# Patient Record
Sex: MALE | Race: WHITE | Employment: FULL TIME | URBAN - METROPOLITAN AREA
[De-identification: names, ages, dates, MRNs, and addresses within clinical notes are randomized per-mention and may not be internally consistent; named-entity substitution may affect disease eponyms.]

---

## 2018-01-08 PROBLEM — E66.01 OBESITY, MORBID (HCC): Status: ACTIVE | Noted: 2018-01-08

## 2019-01-21 ENCOUNTER — APPOINTMENT (OUTPATIENT)
Dept: GENERAL RADIOLOGY | Age: 59
End: 2019-01-21
Attending: EMERGENCY MEDICINE
Payer: COMMERCIAL

## 2019-01-21 ENCOUNTER — HOSPITAL ENCOUNTER (EMERGENCY)
Age: 59
Discharge: HOME | End: 2019-01-21
Attending: EMERGENCY MEDICINE
Payer: COMMERCIAL

## 2019-01-21 VITALS
RESPIRATION RATE: 18 BRPM | OXYGEN SATURATION: 95 % | DIASTOLIC BLOOD PRESSURE: 86 MMHG | SYSTOLIC BLOOD PRESSURE: 107 MMHG | TEMPERATURE: 98 F | HEART RATE: 99 BPM

## 2019-01-21 DIAGNOSIS — R06.02 SHORTNESS OF BREATH: Primary | ICD-10-CM

## 2019-01-21 LAB
ALBUMIN SERPL-MCNC: 3.6 G/DL (ref 3.2–4.6)
ALBUMIN/GLOB SERPL: 1.1 {RATIO} (ref 1.2–3.5)
ALP SERPL-CCNC: 82 U/L (ref 50–136)
ALT SERPL-CCNC: 47 U/L (ref 12–65)
ANION GAP SERPL CALC-SCNC: 6 MMOL/L (ref 7–16)
AST SERPL-CCNC: 28 U/L (ref 15–37)
ATRIAL RATE: 103 BPM
ATRIAL RATE: 96 BPM
BASOPHILS # BLD: 0 K/UL (ref 0–0.2)
BASOPHILS NFR BLD: 0 % (ref 0–2)
BILIRUB SERPL-MCNC: 0.9 MG/DL (ref 0.2–1.1)
BNP SERPL-MCNC: 585 PG/ML
BUN SERPL-MCNC: 21 MG/DL (ref 8–23)
CALCIUM SERPL-MCNC: 8.8 MG/DL (ref 8.3–10.4)
CALCULATED P AXIS, ECG09: 65 DEGREES
CALCULATED P AXIS, ECG09: 67 DEGREES
CALCULATED R AXIS, ECG10: 23 DEGREES
CALCULATED R AXIS, ECG10: 26 DEGREES
CALCULATED T AXIS, ECG11: 142 DEGREES
CALCULATED T AXIS, ECG11: 163 DEGREES
CHLORIDE SERPL-SCNC: 104 MMOL/L (ref 98–107)
CO2 SERPL-SCNC: 30 MMOL/L (ref 21–32)
CREAT SERPL-MCNC: 1 MG/DL (ref 0.8–1.5)
DIAGNOSIS, 93000: NORMAL
DIAGNOSIS, 93000: NORMAL
DIFFERENTIAL METHOD BLD: ABNORMAL
EOSINOPHIL # BLD: 0.1 K/UL (ref 0–0.8)
EOSINOPHIL NFR BLD: 1 % (ref 0.5–7.8)
ERYTHROCYTE [DISTWIDTH] IN BLOOD BY AUTOMATED COUNT: 13.9 % (ref 11.9–14.6)
GLOBULIN SER CALC-MCNC: 3.2 G/DL (ref 2.3–3.5)
GLUCOSE SERPL-MCNC: 207 MG/DL (ref 65–100)
HCT VFR BLD AUTO: 43.5 % (ref 41.1–50.3)
HGB BLD-MCNC: 13.6 G/DL (ref 13.6–17.2)
IMM GRANULOCYTES # BLD AUTO: 0 K/UL (ref 0–0.5)
IMM GRANULOCYTES NFR BLD AUTO: 0 % (ref 0–5)
LYMPHOCYTES # BLD: 1.3 K/UL (ref 0.5–4.6)
LYMPHOCYTES NFR BLD: 13 % (ref 13–44)
MCH RBC QN AUTO: 31.5 PG (ref 26.1–32.9)
MCHC RBC AUTO-ENTMCNC: 31.3 G/DL (ref 31.4–35)
MCV RBC AUTO: 100.7 FL (ref 79.6–97.8)
MONOCYTES # BLD: 0.5 K/UL (ref 0.1–1.3)
MONOCYTES NFR BLD: 5 % (ref 4–12)
NEUTS SEG # BLD: 7.6 K/UL (ref 1.7–8.2)
NEUTS SEG NFR BLD: 79 % (ref 43–78)
NRBC # BLD: 0 K/UL (ref 0–0.2)
P-R INTERVAL, ECG05: 160 MS
P-R INTERVAL, ECG05: 162 MS
PLATELET # BLD AUTO: 202 K/UL (ref 150–450)
PMV BLD AUTO: 9.8 FL (ref 9.4–12.3)
POTASSIUM SERPL-SCNC: 3.9 MMOL/L (ref 3.5–5.1)
PROT SERPL-MCNC: 6.8 G/DL (ref 6.3–8.2)
Q-T INTERVAL, ECG07: 354 MS
Q-T INTERVAL, ECG07: 396 MS
QRS DURATION, ECG06: 104 MS
QRS DURATION, ECG06: 94 MS
QTC CALCULATION (BEZET), ECG08: 463 MS
QTC CALCULATION (BEZET), ECG08: 500 MS
RBC # BLD AUTO: 4.32 M/UL (ref 4.23–5.6)
SODIUM SERPL-SCNC: 140 MMOL/L (ref 136–145)
TROPONIN I BLD-MCNC: 0.02 NG/ML (ref 0.02–0.05)
TROPONIN I SERPL-MCNC: 0.03 NG/ML (ref 0.02–0.05)
VENTRICULAR RATE, ECG03: 103 BPM
VENTRICULAR RATE, ECG03: 96 BPM
WBC # BLD AUTO: 9.6 K/UL (ref 4.3–11.1)

## 2019-01-21 PROCEDURE — 85025 COMPLETE CBC W/AUTO DIFF WBC: CPT

## 2019-01-21 PROCEDURE — 96374 THER/PROPH/DIAG INJ IV PUSH: CPT | Performed by: EMERGENCY MEDICINE

## 2019-01-21 PROCEDURE — 93005 ELECTROCARDIOGRAM TRACING: CPT | Performed by: EMERGENCY MEDICINE

## 2019-01-21 PROCEDURE — 74011250636 HC RX REV CODE- 250/636: Performed by: EMERGENCY MEDICINE

## 2019-01-21 PROCEDURE — 71046 X-RAY EXAM CHEST 2 VIEWS: CPT

## 2019-01-21 PROCEDURE — 99284 EMERGENCY DEPT VISIT MOD MDM: CPT | Performed by: EMERGENCY MEDICINE

## 2019-01-21 PROCEDURE — 84484 ASSAY OF TROPONIN QUANT: CPT

## 2019-01-21 PROCEDURE — 80053 COMPREHEN METABOLIC PANEL: CPT

## 2019-01-21 PROCEDURE — 83880 ASSAY OF NATRIURETIC PEPTIDE: CPT

## 2019-01-21 RX ORDER — FUROSEMIDE 10 MG/ML
40 INJECTION INTRAMUSCULAR; INTRAVENOUS
Status: COMPLETED | OUTPATIENT
Start: 2019-01-21 | End: 2019-01-21

## 2019-01-21 RX ORDER — FUROSEMIDE 40 MG/1
40 TABLET ORAL 2 TIMES DAILY
Qty: 10 TAB | Refills: 0 | Status: SHIPPED | OUTPATIENT
Start: 2019-01-21 | End: 2019-01-24 | Stop reason: SDUPTHER

## 2019-01-21 RX ADMIN — FUROSEMIDE 40 MG: 10 INJECTION, SOLUTION INTRAMUSCULAR; INTRAVENOUS at 11:30

## 2019-01-21 NOTE — ED TRIAGE NOTES
Pt arrives after being sent by PCP for increased shob, hx of valve replacement in 1996. Pt had a 20 pound weight gain as well.

## 2019-01-21 NOTE — ED PROVIDER NOTES
[facility] Emergency Department Liya Mcwilliams is a 59 y.o. male seen on 1/21/2019 at 10:04 AM in the [facility] EMERGENCY DEPT in room ERD/D. Chief Complaint Patient presents with  Shortness of Breath HPI:  
57-year-old male history of aortic valve replacement several years ago admission hospital in [place] presents to the emergency department with increased shortness of breath Reports a 20 pound weight gain over the last several weeks. Some swelling of his abdomen and the period some increasing shortness of breath Was at his primary care physician office today and was referred to the ED for evaluation He is not hypoxic. Heart rate is slightly elevated at 108. No fever. Not hypotensive He appears well. Sitting up smiling. Nontoxic. Review of Systems: Review of Systems Constitutional: Negative for activity change, appetite change, chills, diaphoresis and fever. HENT: Negative. Respiratory: Positive for shortness of breath. Cardiovascular: Positive for leg swelling. Gastrointestinal: Positive for abdominal pain. Negative for nausea and vomiting. Musculoskeletal: Negative. Skin: Negative. Neurological: Negative. Psychiatric/Behavioral: Negative. Past Medical History: Primary Care Doctor: Dasha Sterling NP History reviewed. No pertinent past medical history. History reviewed. No pertinent surgical history. Social History Socioeconomic History  Marital status:  Spouse name: Not on file  Number of children: Not on file  Years of education: Not on file  Highest education level: Not on file Tobacco Use  Smoking status: Former Smoker  Smokeless tobacco: Never Used Substance and Sexual Activity  Alcohol use: Yes  Drug use: No  
 
No current facility-administered medications on file prior to encounter. Current Outpatient Medications on File Prior to Encounter Medication Sig Dispense Refill  doxycycline (MONODOX) 100 mg capsule Take 1 Cap by mouth two (2) times a day. 20 Cap 0  
 fexofenadine (ALLEGRA) 180 mg tablet Take 1 Tab by mouth daily. 30 Tab 0  
 budesonide-formoterol (SYMBICORT) 160-4.5 mcg/actuation HFAA Take 2 Puffs by inhalation two (2) times a day. 1 Inhaler 1  
 warfarin (COUMADIN) 5 mg tablet Take 2 Tabs by mouth daily. 180 Tab 1  
 atorvastatin (LIPITOR) 80 mg tablet Take 1 Tab by mouth daily. 90 Tab 1  
 levothyroxine (SYNTHROID) 200 mcg tablet Take 1 Tab by mouth daily (with breakfast). 90 Tab 1  
 losartan-hydroCHLOROthiazide (HYZAAR) 50-12.5 mg per tablet Take 1 Tab by mouth daily. 90 Tab 1  
 doxycycline (MONODOX) 100 mg capsule Take 1 Cap by mouth two (2) times a day. 20 Cap 0  
 omega 3-dha-epa-fish oil (FISH OIL) 100-160-1,000 mg cap Take  by mouth.  warfarin (COUMADIN) 1 mg tablet Takes 11 mg from Monday through Saturday 90 Tab 1  
 fluocinoNIDE (LIDEX) 0.05 % topical cream Apply  to affected area two (2) times a day. 60 g 0 Allergies Allergen Reactions  Pcn [Penicillins] Hives Physical Exam:  Nursing documentation reviewed. Vitals:  
 01/21/19 1003 01/21/19 1123 BP: 116/80 Pulse: (!) 108 Resp: 18 Temp: 97.4 °F (36.3 °C) SpO2: 95% 95% Vital signs were reviewed. Physical Exam  
Constitutional: He is oriented to person, place, and time. He appears well-developed and well-nourished. Non-toxic appearance. He does not appear ill. No distress. HENT:  
Head: Normocephalic. Mouth/Throat: Oropharynx is clear and moist.  
Eyes: Pupils are equal, round, and reactive to light. Cardiovascular:  
Patient has a systolic murmur consistent with an aortic valve. Pulmonary/Chest: No tachypnea. No respiratory distress. He has no decreased breath sounds. He has no wheezes. Neurological: He is alert and oriented to person, place, and time. Skin: Skin is warm and dry. Nursing note and vitals reviewed. MEDICAL DECISION MAKING: 
MDM Number of Diagnoses or Management Options Diagnosis management comments: Well-appearing 58-year-old male status post aortic valve repair many years ago. [place] He declined repeated attempts to set him up with a cardiologist here in [place]. He is not hypoxic hypotensive tachypnea He appears well. Speaking in full sentences. BNP is slightly elevated at 585. I don't believe he needs admission. Given a dose of Lasix. We'll continue him on this at home for a few days. He has a follow-up appointment with his cardiologist in [place] on Monday. Patient to return to the ED if he worsens in any way or has any new problems. _________________________________________________________ 
ED Evaluation Labs:   
Recent Results (from the past 24 hour(s)) EKG, 12 LEAD, INITIAL Collection Time: 01/21/19 10:07 AM  
Result Value Ref Range Ventricular Rate 103 BPM  
 Atrial Rate 103 BPM  
 P-R Interval 162 ms QRS Duration 94 ms Q-T Interval 354 ms QTC Calculation (Bezet) 463 ms Calculated P Axis 67 degrees Calculated R Axis 23 degrees Calculated T Axis 142 degrees Diagnosis Sinus tachycardia with occasional Premature ventricular complexes Low voltage QRS T wave abnormality, consider lateral ischemia Abnormal ECG No previous ECGs available Confirmed by Joann Betancur ([id number]) on 1/21/2019 12:21:53 PM 
  
CBC WITH AUTOMATED DIFF Collection Time: 01/21/19 10:10 AM  
Result Value Ref Range WBC 9.6 4.3 - 11.1 K/uL  
 RBC 4.32 4.23 - 5.6 M/uL  
 HGB 13.6 13.6 - 17.2 g/dL HCT 43.5 41.1 - 50.3 % .7 (H) 79.6 - 97.8 FL  
 MCH 31.5 26.1 - 32.9 PG  
 MCHC 31.3 (L) 31.4 - 35.0 g/dL  
 RDW 13.9 11.9 - 14.6 % PLATELET 366 803 - 005 K/uL MPV 9.8 9.4 - 12.3 FL ABSOLUTE NRBC 0.00 0.0 - 0.2 K/uL  
 DF AUTOMATED NEUTROPHILS 79 (H) 43 - 78 % LYMPHOCYTES 13 13 - 44 % MONOCYTES 5 4.0 - 12.0 % EOSINOPHILS 1 0.5 - 7.8 % BASOPHILS 0 0.0 - 2.0 % IMMATURE GRANULOCYTES 0 0.0 - 5.0 %  
 ABS. NEUTROPHILS 7.6 1.7 - 8.2 K/UL  
 ABS. LYMPHOCYTES 1.3 0.5 - 4.6 K/UL  
 ABS. MONOCYTES 0.5 0.1 - 1.3 K/UL  
 ABS. EOSINOPHILS 0.1 0.0 - 0.8 K/UL  
 ABS. BASOPHILS 0.0 0.0 - 0.2 K/UL  
 ABS. IMM. GRANS. 0.0 0.0 - 0.5 K/UL METABOLIC PANEL, COMPREHENSIVE Collection Time: 01/21/19 10:10 AM  
Result Value Ref Range Sodium 140 136 - 145 mmol/L Potassium 3.9 3.5 - 5.1 mmol/L Chloride 104 98 - 107 mmol/L  
 CO2 30 21 - 32 mmol/L Anion gap 6 (L) 7 - 16 mmol/L Glucose 207 (H) 65 - 100 mg/dL BUN 21 8 - 23 MG/DL Creatinine 1.00 0.8 - 1.5 MG/DL  
 GFR est AA >60 >60 ml/min/1.73m2 GFR est non-AA >60 >60 ml/min/1.73m2 Calcium 8.8 8.3 - 10.4 MG/DL Bilirubin, total 0.9 0.2 - 1.1 MG/DL  
 ALT (SGPT) 47 12 - 65 U/L  
 AST (SGOT) 28 15 - 37 U/L Alk. phosphatase 82 50 - 136 U/L Protein, total 6.8 6.3 - 8.2 g/dL Albumin 3.6 3.2 - 4.6 g/dL Globulin 3.2 2.3 - 3.5 g/dL A-G Ratio 1.1 (L) 1.2 - 3.5 BNP Collection Time: 01/21/19 10:10 AM  
Result Value Ref Range  (H) 0 pg/mL TROPONIN I Collection Time: 01/21/19 10:10 AM  
Result Value Ref Range Troponin-I, Qt. 0.03 0.02 - 0.05 NG/ML  
  reviewed Radiology studies performed: XR CHEST PA LAT Final Result IMPRESSION: Cardiomegaly with postoperative changes and mild volume overload. reviewed Orders Placed This Encounter  XR CHEST PA LAT  CBC WITH AUTOMATED DIFF  
 METABOLIC PANEL,COMP.  
 BNP  TROPONIN I  
 EKG 12 LEAD INITIAL  
 INSERT PERIPHERAL IV ONE TIME STAT  furosemide (LASIX) injection 40 mg  
 
 
Medications  
furosemide (LASIX) injection 40 mg (40 mg IntraVENous Given 1/21/19 1130)  
 
________________________________________________________ Procedures 
 
====================================================== 
 Dispo/Plan:    home Condition:  improved Diagnosis:     Dyspnea, mild CHF exacerbation Mamadou Jones MD; 1/21/2019 @10:04 AM================================ 
 
ED Course as of Jan 21 1314 Mon Jan 21, 2019  
1219 Patient sitting up. He looks well. Nontoxic. Doesn't appear short of breath. He complains of some abdominal pain related to distention and bloating. has a benign abdominal exam.  [GH] ED Course User Index Chandana Goldstein MD

## 2019-01-21 NOTE — ED NOTES

## 2019-01-21 NOTE — LETTER
[id number] [place]GENCY DEPT [address] [address] 
[phone] Work/School Note Date: 1/21/2019 To Whom It May concern: 
 
Dayron Young was seen and treated today in the office by the following No name on file. Aron Young may return to work on 1/24.  
 
Sincerely, 
 
 
 
 
Belem Perez MD

## 2019-01-21 NOTE — DISCHARGE INSTRUCTIONS
Recent Results (from the past 8 hour(s))   EKG, 12 LEAD, INITIAL    Collection Time: 01/21/19 10:07 AM   Result Value Ref Range    Ventricular Rate 103 BPM    Atrial Rate 103 BPM    P-R Interval 162 ms    QRS Duration 94 ms    Q-T Interval 354 ms    QTC Calculation (Bezet) 463 ms    Calculated P Axis 67 degrees    Calculated R Axis 23 degrees    Calculated T Axis 142 degrees    Diagnosis       Sinus tachycardia with occasional Premature ventricular complexes  Low voltage QRS  T wave abnormality, consider lateral ischemia  Abnormal ECG  No previous ECGs available  Confirmed by Arturo Riggins ([id number]) on 1/21/2019 12:21:53 PM     CBC WITH AUTOMATED DIFF    Collection Time: 01/21/19 10:10 AM   Result Value Ref Range    WBC 9.6 4.3 - 11.1 K/uL    RBC 4.32 4.23 - 5.6 M/uL    HGB 13.6 13.6 - 17.2 g/dL    HCT 43.5 41.1 - 50.3 %    .7 (H) 79.6 - 97.8 FL    MCH 31.5 26.1 - 32.9 PG    MCHC 31.3 (L) 31.4 - 35.0 g/dL    RDW 13.9 11.9 - 14.6 %    PLATELET 795 516 - 662 K/uL    MPV 9.8 9.4 - 12.3 FL    ABSOLUTE NRBC 0.00 0.0 - 0.2 K/uL    DF AUTOMATED      NEUTROPHILS 79 (H) 43 - 78 %    LYMPHOCYTES 13 13 - 44 %    MONOCYTES 5 4.0 - 12.0 %    EOSINOPHILS 1 0.5 - 7.8 %    BASOPHILS 0 0.0 - 2.0 %    IMMATURE GRANULOCYTES 0 0.0 - 5.0 %    ABS. NEUTROPHILS 7.6 1.7 - 8.2 K/UL    ABS. LYMPHOCYTES 1.3 0.5 - 4.6 K/UL    ABS. MONOCYTES 0.5 0.1 - 1.3 K/UL    ABS. EOSINOPHILS 0.1 0.0 - 0.8 K/UL    ABS. BASOPHILS 0.0 0.0 - 0.2 K/UL    ABS. IMM.  GRANS. 0.0 0.0 - 0.5 K/UL   METABOLIC PANEL, COMPREHENSIVE    Collection Time: 01/21/19 10:10 AM   Result Value Ref Range    Sodium 140 136 - 145 mmol/L    Potassium 3.9 3.5 - 5.1 mmol/L    Chloride 104 98 - 107 mmol/L    CO2 30 21 - 32 mmol/L    Anion gap 6 (L) 7 - 16 mmol/L    Glucose 207 (H) 65 - 100 mg/dL    BUN 21 8 - 23 MG/DL    Creatinine 1.00 0.8 - 1.5 MG/DL    GFR est AA >60 >60 ml/min/1.73m2    GFR est non-AA >60 >60 ml/min/1.73m2    Calcium 8.8 8.3 - 10.4 MG/DL    Bilirubin, total 0.9 0.2 - 1.1 MG/DL    ALT (SGPT) 47 12 - 65 U/L    AST (SGOT) 28 15 - 37 U/L    Alk.  phosphatase 82 50 - 136 U/L    Protein, total 6.8 6.3 - 8.2 g/dL    Albumin 3.6 3.2 - 4.6 g/dL    Globulin 3.2 2.3 - 3.5 g/dL    A-G Ratio 1.1 (L) 1.2 - 3.5     BNP    Collection Time: 01/21/19 10:10 AM   Result Value Ref Range     (H) 0 pg/mL   TROPONIN I    Collection Time: 01/21/19 10:10 AM   Result Value Ref Range    Troponin-I, Qt. 0.03 0.02 - 0.05 NG/ML

## 2020-09-28 ENCOUNTER — HOSPITAL ENCOUNTER (OUTPATIENT)
Dept: LAB | Age: 60
Discharge: HOME | End: 2020-09-28

## 2020-09-28 PROCEDURE — 88305 TISSUE EXAM BY PATHOLOGIST: CPT
